# Patient Record
Sex: FEMALE | Race: WHITE | NOT HISPANIC OR LATINO | Employment: FULL TIME | URBAN - METROPOLITAN AREA
[De-identification: names, ages, dates, MRNs, and addresses within clinical notes are randomized per-mention and may not be internally consistent; named-entity substitution may affect disease eponyms.]

---

## 2024-11-28 ENCOUNTER — OFFICE VISIT (OUTPATIENT)
Dept: URGENT CARE | Facility: CLINIC | Age: 59
End: 2024-11-28
Payer: MEDICAID

## 2024-11-28 VITALS
HEIGHT: 63 IN | WEIGHT: 130 LBS | TEMPERATURE: 98 F | SYSTOLIC BLOOD PRESSURE: 123 MMHG | DIASTOLIC BLOOD PRESSURE: 82 MMHG | RESPIRATION RATE: 16 BRPM | BODY MASS INDEX: 23.04 KG/M2 | HEART RATE: 87 BPM

## 2024-11-28 DIAGNOSIS — R05.1 ACUTE COUGH: ICD-10-CM

## 2024-11-28 DIAGNOSIS — R51.9 SINUS HEADACHE: ICD-10-CM

## 2024-11-28 DIAGNOSIS — J02.9 SORE THROAT: ICD-10-CM

## 2024-11-28 DIAGNOSIS — J01.00 ACUTE MAXILLARY SINUSITIS, RECURRENCE NOT SPECIFIED: Primary | ICD-10-CM

## 2024-11-28 LAB
CTP QC/QA: YES
MOLECULAR STREP A: NEGATIVE

## 2024-11-28 PROCEDURE — 99213 OFFICE O/P EST LOW 20 MIN: CPT | Mod: S$GLB,,, | Performed by: STUDENT IN AN ORGANIZED HEALTH CARE EDUCATION/TRAINING PROGRAM

## 2024-11-28 RX ORDER — BUTYROSPERMUM PARKII(SHEA BUTTER), SIMMONDSIA CHINENSIS (JOJOBA) SEED OIL, ALOE BARBADENSIS LEAF EXTRACT .01; 1; 3.5 G/100G; G/100G; G/100G
2000 LIQUID TOPICAL
COMMUNITY
Start: 2024-09-26

## 2024-11-28 RX ORDER — MISOPROSTOL 200 UG/1
200 TABLET ORAL 2 TIMES DAILY
COMMUNITY
Start: 2024-11-27

## 2024-11-28 RX ORDER — CETIRIZINE HYDROCHLORIDE 10 MG/1
10 TABLET ORAL
COMMUNITY
Start: 2024-11-27

## 2024-11-28 RX ORDER — GABAPENTIN 800 MG/1
800 TABLET ORAL 3 TIMES DAILY
COMMUNITY
Start: 2024-11-27

## 2024-11-28 RX ORDER — LINACLOTIDE 72 UG/1
72 CAPSULE, GELATIN COATED ORAL
COMMUNITY
Start: 2024-11-27

## 2024-11-28 RX ORDER — DICYCLOMINE HYDROCHLORIDE 10 MG/1
10 CAPSULE ORAL
COMMUNITY
Start: 2024-11-27

## 2024-11-28 RX ORDER — CYCLOBENZAPRINE HCL 10 MG
TABLET ORAL
COMMUNITY
Start: 2024-11-27

## 2024-11-28 RX ORDER — LURASIDONE HYDROCHLORIDE 20 MG/1
TABLET, FILM COATED ORAL
COMMUNITY
Start: 2024-11-27

## 2024-11-28 RX ORDER — AMLODIPINE BESYLATE 5 MG/1
5 TABLET ORAL
COMMUNITY
Start: 2024-11-27

## 2024-11-28 RX ORDER — FLUTICASONE PROPIONATE 50 MCG
1 SPRAY, SUSPENSION (ML) NASAL 2 TIMES DAILY
COMMUNITY
Start: 2024-11-27

## 2024-11-28 RX ORDER — SUMATRIPTAN SUCCINATE 25 MG/1
TABLET ORAL
COMMUNITY
Start: 2024-11-27

## 2024-11-28 RX ORDER — BENZONATATE 100 MG/1
100 CAPSULE ORAL 3 TIMES DAILY PRN
Qty: 30 CAPSULE | Refills: 0 | Status: SHIPPED | OUTPATIENT
Start: 2024-11-28 | End: 2024-12-08

## 2024-11-28 RX ORDER — ROPINIROLE 1 MG/1
1 TABLET, FILM COATED ORAL NIGHTLY
COMMUNITY
Start: 2024-11-27

## 2024-11-28 RX ORDER — ALPRAZOLAM 0.5 MG/1
0.5 TABLET ORAL
COMMUNITY
Start: 2024-11-27

## 2024-11-28 RX ORDER — CELECOXIB 100 MG/1
CAPSULE ORAL
COMMUNITY
Start: 2024-11-27

## 2024-11-28 RX ORDER — OLMESARTAN MEDOXOMIL 5 MG/1
5 TABLET ORAL
COMMUNITY
Start: 2024-11-27

## 2024-11-28 RX ORDER — LAMOTRIGINE 200 MG/1
200 TABLET ORAL NIGHTLY
COMMUNITY
Start: 2024-11-27

## 2024-11-28 RX ORDER — DESVENLAFAXINE 100 MG/1
100 TABLET, EXTENDED RELEASE ORAL
COMMUNITY
Start: 2024-11-27

## 2024-11-28 RX ORDER — DOXYCYCLINE 100 MG/1
100 CAPSULE ORAL EVERY 12 HOURS
Qty: 10 CAPSULE | Refills: 0 | Status: SHIPPED | OUTPATIENT
Start: 2024-11-28 | End: 2024-12-03

## 2024-11-28 NOTE — PATIENT INSTRUCTIONS
-Please take antibiotic to completion.  -Take tessalon perles during the day and cough syrup at night as needed. Be aware the cough syrup may cause drowsiness.    Below are suggestions for symptomatic relief:   -Tylenol every 4 hours OR ibuprofen every 6 hours as needed for pain/fever.   -Salt water gargles to soothe throat pain.   -Chloroseptic spray also helps to numb throat pain.   -Nasal saline spray reduces inflammation and dryness.   -Warm face compresses to help with facial sinus pain/pressure.   -Vicks vapor rub at night.   -Flonase OTC or Nasacort OTC for nasal congestion.   -Simple foods like chicken noodle soup.   -Delsym helps with coughing at night   -Zyrtec/Claritin during the day & Benadryl at night may help with allergies.     If you DO NOT have Hypertension or any history of palpitations, it is ok to take over the counter Sudafed or Mucinex D or Allegra-D or Claritin-D or Zyrtec-D.  If you do take one of the above, it is ok to combine that with plain over the counter Mucinex or Allegra or Claritin or Zyrtec. If, for example, you are taking Zyrtec -D, you can combine that with Mucinex, but not Mucinex-D.  If you are taking Mucinex-D, you can combine that with plain Allegra or Claritin or Zyrtec.   If you DO have Hypertension or palpitations, it is safe to take Coricidin HBP for relief of sinus symptoms.    Please follow up with your primary care provider within 2-5 days if your signs and symptoms have not resolved or worsen.     If your condition worsens or fails to improve we recommend that you receive another evaluation at the emergency room immediately or contact your primary medical clinic to discuss your concerns.   You must understand that you have received an Urgent Care treatment only and that you may be released before all of your medical problems are known or treated. You, the patient, will arrange for follow up care as instructed.

## 2024-11-28 NOTE — PROGRESS NOTES
"Subjective:      Patient ID: Naty Antunez is a 59 y.o. female.    Vitals:  height is 5' 3" (1.6 m) and weight is 59 kg (130 lb). Her oral temperature is 97.9 °F (36.6 °C). Her blood pressure is 123/82 and her pulse is 87. Her respiration is 16.     Chief Complaint: Sore Throat    Patient complaints: sore throat and a headache x 3 days  -taking dayquil cold/flu  -she is having a sinus headache    Sore Throat   This is a new problem. The current episode started in the past 7 days. The problem has been gradually worsening. Neither side of throat is experiencing more pain than the other. The pain is at a severity of 7/10. The pain is moderate. Associated symptoms include coughing. Pertinent negatives include no shortness of breath.       Constitution: Negative for chills and fever.   HENT:  Positive for sinus pain, sinus pressure and sore throat.    Cardiovascular:  Negative for chest pain.   Respiratory:  Positive for cough and sputum production (yellow/green). Negative for shortness of breath.       Objective:     Physical Exam   HENT:   Head: Normocephalic and atraumatic.   Ears:   Right Ear: A middle ear effusion is present.   Left Ear: A middle ear effusion is present.   Nose: Right sinus exhibits maxillary sinus tenderness. Right sinus exhibits no frontal sinus tenderness. Left sinus exhibits maxillary sinus tenderness. Left sinus exhibits no frontal sinus tenderness.   Mouth/Throat: Uvula is midline, oropharynx is clear and moist and mucous membranes are normal. Mucous membranes are moist. No tonsillar exudate. Oropharynx is clear.   Eyes: Conjunctivae are normal. Pupils are equal, round, and reactive to light.   Cardiovascular: Normal rate, regular rhythm and normal heart sounds.   Pulmonary/Chest: Effort normal and breath sounds normal.   Abdominal: Normal appearance.   Neurological: She is alert.   Psychiatric: Her behavior is normal. Mood normal.     Results for orders placed or performed in visit on " 11/28/24   POCT Strep A, Molecular    Collection Time: 11/28/24 12:17 PM   Result Value Ref Range    Molecular Strep A, POC Negative Negative     Acceptable Yes        Assessment:     1. Acute maxillary sinusitis, recurrence not specified    2. Sore throat    3. Sinus headache    4. Acute cough        Plan:       Acute maxillary sinusitis, recurrence not specified  -     doxycycline (VIBRAMYCIN) 100 MG Cap; Take 1 capsule (100 mg total) by mouth every 12 (twelve) hours. for 5 days  Dispense: 10 capsule; Refill: 0    Sore throat  -     POCT Strep A, Molecular    Sinus headache  -     POCT Strep A, Molecular    Acute cough  -     benzonatate (TESSALON) 100 MG capsule; Take 1 capsule (100 mg total) by mouth 3 (three) times daily as needed for Cough.  Dispense: 30 capsule; Refill: 0    Hypertension-well controlled, continue amlodipine 5 mg once daily and olmesartan 5 mg once daily      -Please take antibiotic to completion.  -Take tessalon perles during the day and cough syrup at night as needed. Be aware the cough syrup may cause drowsiness.    Below are suggestions for symptomatic relief:   -Tylenol every 4 hours OR ibuprofen every 6 hours as needed for pain/fever.   -Salt water gargles to soothe throat pain.   -Chloroseptic spray also helps to numb throat pain.   -Nasal saline spray reduces inflammation and dryness.   -Warm face compresses to help with facial sinus pain/pressure.   -Vicks vapor rub at night.   -Flonase OTC or Nasacort OTC for nasal congestion.   -Simple foods like chicken noodle soup.   -Delsym helps with coughing at night   -Zyrtec/Claritin during the day & Benadryl at night may help with allergies.     If you DO NOT have Hypertension or any history of palpitations, it is ok to take over the counter Sudafed or Mucinex D or Allegra-D or Claritin-D or Zyrtec-D.  If you do take one of the above, it is ok to combine that with plain over the counter Mucinex or Allegra or Claritin or  Zyrtec. If, for example, you are taking Zyrtec -D, you can combine that with Mucinex, but not Mucinex-D.  If you are taking Mucinex-D, you can combine that with plain Allegra or Claritin or Zyrtec.   If you DO have Hypertension or palpitations, it is safe to take Coricidin HBP for relief of sinus symptoms.    Please follow up with your primary care provider within 2-5 days if your signs and symptoms have not resolved or worsen.     If your condition worsens or fails to improve we recommend that you receive another evaluation at the emergency room immediately or contact your primary medical clinic to discuss your concerns.   You must understand that you have received an Urgent Care treatment only and that you may be released before all of your medical problems are known or treated. You, the patient, will arrange for follow up care as instructed.    Medical Decision Making:   Differential Diagnosis:   Acute maxillary sinusitis  Clinical Tests:   Lab Tests: Ordered and Reviewed       <> Summary of Lab: Strep neg  Urgent Care Management:  Patient complaints: sore throat and a headache x 3 days  -taking dayquil cold/flu  -she is having a sinus headache.  Vitals within normal limits.  Physical Exam   HENT:   Head: Normocephalic and atraumatic.   Ears:   Right Ear: A middle ear effusion is present.   Left Ear: A middle ear effusion is present.   Nose: Right sinus exhibits maxillary sinus tenderness. Right sinus exhibits no frontal sinus tenderness. Left sinus exhibits maxillary sinus tenderness. Left sinus exhibits no frontal sinus tenderness.   Mouth/Throat: Uvula is midline, oropharynx is clear and moist and mucous membranes are normal. Mucous membranes are moist. No tonsillar exudate. Oropharynx is clear.   Eyes: Conjunctivae are normal. Pupils are equal, round, and reactive to light.   Cardiovascular: Normal rate, regular rhythm and normal heart sounds.   Pulmonary/Chest: Effort normal and breath sounds normal.    Abdominal: Normal appearance.   Neurological: She is alert.   Psychiatric: Her behavior is normal. Mood normal.   The patient was sent home with Tessalon Perles as needed for cough and doxy twice a day for 5 days. ED and return precautions were given. The pt rik.                 28-Jul-2024 19:55